# Patient Record
Sex: FEMALE | NOT HISPANIC OR LATINO | URBAN - METROPOLITAN AREA
[De-identification: names, ages, dates, MRNs, and addresses within clinical notes are randomized per-mention and may not be internally consistent; named-entity substitution may affect disease eponyms.]

---

## 2021-06-03 ENCOUNTER — APPOINTMENT (RX ONLY)
Dept: URBAN - METROPOLITAN AREA CLINIC 38 | Facility: CLINIC | Age: 46
Setting detail: DERMATOLOGY
End: 2021-06-03

## 2021-06-03 DIAGNOSIS — L65.8 OTHER SPECIFIED NONSCARRING HAIR LOSS: ICD-10-CM

## 2021-06-03 PROCEDURE — ? ADDITIONAL NOTES

## 2021-06-03 PROCEDURE — 99203 OFFICE O/P NEW LOW 30 MIN: CPT

## 2021-06-03 PROCEDURE — ? COUNSELING

## 2021-06-03 PROCEDURE — ? ORDER TESTS

## 2021-06-03 PROCEDURE — ? TREATMENT REGIMEN

## 2021-06-03 ASSESSMENT — WOMENS ALOPECIA SEVERITY SCALE: PLEASE ASSSESS THE PATIENT'S MID SCALP ALOPECIA SEVERITY BY COMPARING IT TO THESE PHOTOS: WIDENED MIDLINE PART

## 2021-06-03 ASSESSMENT — LOCATION ZONE DERM: LOCATION ZONE: SCALP

## 2021-06-03 ASSESSMENT — LOCATION SIMPLE DESCRIPTION DERM: LOCATION SIMPLE: ANTERIOR SCALP

## 2021-06-03 ASSESSMENT — LOCATION DETAILED DESCRIPTION DERM: LOCATION DETAILED: MID-FRONTAL SCALP

## 2021-06-03 NOTE — PROCEDURE: ORDER TESTS
Billing Type: Third-Party Bill
Expected Date Of Service: 06/03/2021
Performing Laboratory: [id number]
Bill For Surgical Tray: no

## 2021-06-03 NOTE — HPI: HAIR LOSS
How Did The Hair Loss Occur?: gradual in onset
How Severe Is Your Hair Loss?: moderate
Additional History: Wears surgical cap often [place] past year. Stopped going to hair salon last year but does box color every 3-4 months